# Patient Record
Sex: MALE | Race: WHITE | NOT HISPANIC OR LATINO | URBAN - METROPOLITAN AREA
[De-identification: names, ages, dates, MRNs, and addresses within clinical notes are randomized per-mention and may not be internally consistent; named-entity substitution may affect disease eponyms.]

---

## 2020-09-28 ENCOUNTER — TELEPHONE ENCOUNTER (OUTPATIENT)
Dept: URBAN - METROPOLITAN AREA CLINIC 128 | Facility: CLINIC | Age: 55
End: 2020-09-28

## 2020-10-08 ENCOUNTER — OFFICE VISIT (OUTPATIENT)
Dept: URBAN - METROPOLITAN AREA SURGERY CENTER 31 | Facility: SURGERY CENTER | Age: 55
End: 2020-10-08
Payer: COMMERCIAL

## 2020-10-08 DIAGNOSIS — Z80.0 FAMILY HISTORY MALIGNANT NEOPLASM OF BILIARY TRACT: ICD-10-CM

## 2020-10-08 PROCEDURE — G0105 COLORECTAL SCRN; HI RISK IND: HCPCS | Performed by: INTERNAL MEDICINE

## 2020-10-08 PROCEDURE — G8907 PT DOC NO EVENTS ON DISCHARG: HCPCS | Performed by: INTERNAL MEDICINE

## 2023-03-14 ENCOUNTER — WEB ENCOUNTER (OUTPATIENT)
Dept: URBAN - METROPOLITAN AREA CLINIC 128 | Facility: CLINIC | Age: 58
End: 2023-03-14

## 2023-03-15 ENCOUNTER — WEB ENCOUNTER (OUTPATIENT)
Dept: URBAN - METROPOLITAN AREA CLINIC 128 | Facility: CLINIC | Age: 58
End: 2023-03-15

## 2023-03-15 ENCOUNTER — OFFICE VISIT (OUTPATIENT)
Dept: URBAN - METROPOLITAN AREA CLINIC 128 | Facility: CLINIC | Age: 58
End: 2023-03-15
Payer: COMMERCIAL

## 2023-03-15 VITALS
WEIGHT: 230 LBS | TEMPERATURE: 97.9 F | HEIGHT: 73 IN | SYSTOLIC BLOOD PRESSURE: 126 MMHG | BODY MASS INDEX: 30.48 KG/M2 | DIASTOLIC BLOOD PRESSURE: 72 MMHG

## 2023-03-15 DIAGNOSIS — R13.10 DYSPHAGIA, UNSPECIFIED: ICD-10-CM

## 2023-03-15 PROBLEM — 40739000 DYSPHAGIA: Status: ACTIVE | Noted: 2023-03-15

## 2023-03-15 PROCEDURE — 99203 OFFICE O/P NEW LOW 30 MIN: CPT | Performed by: INTERNAL MEDICINE

## 2023-03-15 RX ORDER — LANSOPRAZOLE 15 MG/1
1 CAPSULE BEFORE A MEAL CAPSULE, DELAYED RELEASE ORAL ONCE A DAY
Status: ACTIVE | COMMUNITY

## 2023-03-15 RX ORDER — LANSOPRAZOLE 30 MG/1
1 TABLET TABLET, ORALLY DISINTEGRATING, DELAYED RELEASE ORAL TWICE A DAY
Qty: 180 TABLET | Refills: 3 | OUTPATIENT
Start: 2023-03-15

## 2023-03-15 NOTE — HPI-TODAY'S VISIT:
Mr. Muñoz comes in for evaluation of dysphagia.  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 Stricture evaluation and dilation has been in a hospital GI lab because the stenosis required a pediatric gastroscope for evaluation.  Last dilation was to 33Fr only because of deep mucosal tear.  He offers that this helped his swallowing.  He has been avoiding swallowing pills because there is a hx of esophageal obstruction with pills.  He eats very slowly and drinks alot of liquid with meals.  Weight stable. No heartburn or chest pain. No blood thinners.  No heart or pulmonary disease.  His oncologist wants to start him on a pill therapy for his CLL if able to take pills consistently.

## 2023-06-06 ENCOUNTER — OFFICE VISIT (OUTPATIENT)
Dept: URBAN - METROPOLITAN AREA MEDICAL CENTER 25 | Facility: MEDICAL CENTER | Age: 58
End: 2023-06-06
Payer: COMMERCIAL

## 2023-06-06 DIAGNOSIS — K22.2 ACQUIRED ESOPHAGEAL RING: ICD-10-CM

## 2023-06-06 DIAGNOSIS — K22.89 DILATATION OF ESOPHAGUS: ICD-10-CM

## 2023-06-06 PROCEDURE — 43239 EGD BIOPSY SINGLE/MULTIPLE: CPT | Performed by: INTERNAL MEDICINE

## 2023-06-13 ENCOUNTER — TELEPHONE ENCOUNTER (OUTPATIENT)
Dept: URBAN - METROPOLITAN AREA CLINIC 128 | Facility: CLINIC | Age: 58
End: 2023-06-13

## 2023-06-14 ENCOUNTER — TELEPHONE ENCOUNTER (OUTPATIENT)
Dept: URBAN - METROPOLITAN AREA CLINIC 128 | Facility: CLINIC | Age: 58
End: 2023-06-14

## 2023-06-14 PROBLEM — 235599003: Status: ACTIVE | Noted: 2023-06-14

## 2023-06-14 RX ORDER — LANSOPRAZOLE 15 MG/1
1 CAPSULE BEFORE A MEAL CAPSULE, DELAYED RELEASE ORAL ONCE A DAY
Status: ACTIVE | COMMUNITY

## 2023-06-14 RX ORDER — FLUTICASONE PROPIONATE 220 UG/1
2 PUFFS AEROSOL, METERED RESPIRATORY (INHALATION) TWICE A DAY
Qty: 2 APPLICATOR | Refills: 1 | OUTPATIENT
Start: 2023-06-14 | End: 2023-12-10

## 2023-06-14 RX ORDER — LANSOPRAZOLE 30 MG/1
1 TABLET TABLET, ORALLY DISINTEGRATING, DELAYED RELEASE ORAL TWICE A DAY
Qty: 180 TABLET | Refills: 3 | Status: ACTIVE | COMMUNITY
Start: 2023-03-15

## 2023-07-03 ENCOUNTER — WEB ENCOUNTER (OUTPATIENT)
Dept: URBAN - METROPOLITAN AREA CLINIC 128 | Facility: CLINIC | Age: 58
End: 2023-07-03

## 2023-07-06 ENCOUNTER — OFFICE VISIT (OUTPATIENT)
Dept: URBAN - METROPOLITAN AREA CLINIC 128 | Facility: CLINIC | Age: 58
End: 2023-07-06
Payer: COMMERCIAL

## 2023-07-06 VITALS
WEIGHT: 234.6 LBS | BODY MASS INDEX: 31.09 KG/M2 | DIASTOLIC BLOOD PRESSURE: 88 MMHG | HEART RATE: 61 BPM | SYSTOLIC BLOOD PRESSURE: 140 MMHG | TEMPERATURE: 97.8 F | HEIGHT: 73 IN

## 2023-07-06 DIAGNOSIS — K20.90 ACUTE ESOPHAGITIS: ICD-10-CM

## 2023-07-06 PROCEDURE — 99214 OFFICE O/P EST MOD 30 MIN: CPT | Performed by: INTERNAL MEDICINE

## 2023-07-06 RX ORDER — FLUTICASONE PROPIONATE 220 UG/1
2 PUFFS AEROSOL, METERED RESPIRATORY (INHALATION) TWICE A DAY
Qty: 2 APPLICATOR | Refills: 1 | OUTPATIENT

## 2023-07-06 RX ORDER — FLUTICASONE PROPIONATE 220 UG/1
2 PUFFS AEROSOL, METERED RESPIRATORY (INHALATION) TWICE A DAY
Qty: 2 APPLICATOR | Refills: 1 | Status: ACTIVE | COMMUNITY
Start: 2023-06-14 | End: 2023-12-10

## 2023-07-06 RX ORDER — LANSOPRAZOLE 15 MG/1
1 CAPSULE BEFORE A MEAL CAPSULE, DELAYED RELEASE ORAL ONCE A DAY
Status: ACTIVE | COMMUNITY

## 2023-07-06 RX ORDER — LANSOPRAZOLE 30 MG/1
1 TABLET TABLET, ORALLY DISINTEGRATING, DELAYED RELEASE ORAL TWICE A DAY
Qty: 180 TABLET | Refills: 3 | Status: ACTIVE | COMMUNITY
Start: 2023-03-15

## 2023-07-06 NOTE — HPI-TODAY'S VISIT:
Mr. Muñoz comes in for evaluation of dysphagia.  There is a long hx of difficulty swallowing associated with a mid esophageal high grade stenosis. Stricture evaluation and dilation has been in a hospital GI lab because the stenosis required a pediatric gastroscope for evaluation.  Last dilation was to 33Fr only because of deep mucosal tear.  He offers that this helped his swallowing.  He has been avoiding swallowing pills because there is a hx of esophageal obstruction with pills.  He eats very slowly and drinks alot of liquid with meals.  Weight stable. No heartburn or chest pain. No blood thinners.  No heart or pulmonary disease.  His oncologist wants his dysphagia treated in the event he ever has to start on a pill therapy for his CLL. For now, his CLL is well controlled and he will not need these pills. The oncologist is trying to circumvent using a feeding tube to get his CLL treatment in in the event he ever needs this therapy in the future. His 2020 colonoscopy was normal, advised to repeat it in 5 years. His 2023 EGD revealed eosinophilic esophagitis. He took 1 week of the flovent then stopped it as he was not aware he needed to take it for 2 months. He states his dysphagia symptoms have now ceased and he felt better even after only 1 week of the flovent.

## 2023-07-11 ENCOUNTER — TELEPHONE ENCOUNTER (OUTPATIENT)
Dept: URBAN - METROPOLITAN AREA CLINIC 128 | Facility: CLINIC | Age: 58
End: 2023-07-11

## 2023-07-11 RX ORDER — DUPILUMAB 300 MG/2ML
AS DIRECTED INJECTION, SOLUTION SUBCUTANEOUS
Qty: 4 | Refills: 2 | OUTPATIENT
Start: 2023-07-11 | End: 2023-10-09

## 2023-07-12 ENCOUNTER — TELEPHONE ENCOUNTER (OUTPATIENT)
Dept: URBAN - METROPOLITAN AREA CLINIC 128 | Facility: CLINIC | Age: 58
End: 2023-07-12

## 2023-07-13 ENCOUNTER — TELEPHONE ENCOUNTER (OUTPATIENT)
Dept: URBAN - METROPOLITAN AREA CLINIC 128 | Facility: CLINIC | Age: 58
End: 2023-07-13

## 2023-07-26 ENCOUNTER — TELEPHONE ENCOUNTER (OUTPATIENT)
Dept: URBAN - METROPOLITAN AREA CLINIC 19 | Facility: CLINIC | Age: 58
End: 2023-07-26

## 2023-07-28 ENCOUNTER — OFFICE VISIT (OUTPATIENT)
Dept: URBAN - METROPOLITAN AREA CLINIC 128 | Facility: CLINIC | Age: 58
End: 2023-07-28

## 2023-08-31 ENCOUNTER — OFFICE VISIT (OUTPATIENT)
Dept: URBAN - METROPOLITAN AREA CLINIC 128 | Facility: CLINIC | Age: 58
End: 2023-08-31

## 2023-09-01 ENCOUNTER — OFFICE VISIT (OUTPATIENT)
Dept: URBAN - METROPOLITAN AREA CLINIC 128 | Facility: CLINIC | Age: 58
End: 2023-09-01
Payer: COMMERCIAL

## 2023-09-01 VITALS
DIASTOLIC BLOOD PRESSURE: 78 MMHG | SYSTOLIC BLOOD PRESSURE: 126 MMHG | HEIGHT: 73 IN | HEART RATE: 60 BPM | WEIGHT: 228.2 LBS | TEMPERATURE: 97.5 F | BODY MASS INDEX: 30.24 KG/M2

## 2023-09-01 DIAGNOSIS — K20.0 EOSINOPHILIC ESOPHAGITIS: ICD-10-CM

## 2023-09-01 DIAGNOSIS — Z80.0 FAMILY HISTORY OF COLON CANCER: ICD-10-CM

## 2023-09-01 PROCEDURE — 99214 OFFICE O/P EST MOD 30 MIN: CPT | Performed by: PHYSICIAN ASSISTANT

## 2023-09-01 RX ORDER — DUPILUMAB 300 MG/2ML
AS DIRECTED INJECTION, SOLUTION SUBCUTANEOUS
Qty: 4 | Refills: 2 | Status: ACTIVE | COMMUNITY
Start: 2023-07-11 | End: 2023-10-09

## 2023-09-01 RX ORDER — LANSOPRAZOLE 15 MG/1
1 CAPSULE BEFORE A MEAL CAPSULE, DELAYED RELEASE ORAL ONCE A DAY
Status: ACTIVE | COMMUNITY

## 2023-09-01 RX ORDER — FLUTICASONE PROPIONATE 220 UG/1
2 PUFFS AEROSOL, METERED RESPIRATORY (INHALATION) TWICE A DAY
Qty: 2 APPLICATOR | Refills: 1 | OUTPATIENT

## 2023-09-01 RX ORDER — FLUTICASONE PROPIONATE 220 UG/1
2 PUFFS AEROSOL, METERED RESPIRATORY (INHALATION) TWICE A DAY
Qty: 2 APPLICATOR | Refills: 1 | Status: ACTIVE | COMMUNITY

## 2023-09-01 RX ORDER — LANSOPRAZOLE 30 MG/1
1 TABLET TABLET, ORALLY DISINTEGRATING, DELAYED RELEASE ORAL TWICE A DAY
Qty: 180 TABLET | Refills: 3 | Status: ACTIVE | COMMUNITY
Start: 2023-03-15

## 2023-09-01 NOTE — HPI-TODAY'S VISIT:
Mr. Muñoz comes in for follow up evaluation of dysphagia which has improved vastly since last visit. He is tolerating the dupixent well. There is a long hx of difficulty swallowing associated with a mid esophageal high grade stenosis. Stricture evaluation and dilation has been in a hospital GI lab because the stenosis required a pediatric gastroscope for evaluation.  Last dilation was to 33Fr only because of deep mucosal tear.  He offers that this helped his swallowing.  He has been avoiding swallowing pills because there is a hx of esophageal obstruction with pills.  He eats very slowly and drinks alot of liquid with meals but is now feeling.  Weight stable. No heartburn or chest pain. No blood thinners.  No heart or pulmonary disease.  His oncologist wants his dysphagia treated in the event he ever has to start on a pill therapy for his CLL. For now, his CLL is well controlled and he will not need these pills. The oncologist is trying to circumvent using a feeding tube to get his CLL treatment in in the event he ever needs this therapy in the future. His 2020 colonoscopy was normal, advised to repeat it in 5 years. His 2023 EGD revealed eosinophilic esophagitis. His oncologist is Dr. Lombardi with [place] who is managing his CLL.

## 2023-09-20 ENCOUNTER — ERX REFILL RESPONSE (OUTPATIENT)
Dept: URBAN - METROPOLITAN AREA CLINIC 128 | Facility: CLINIC | Age: 58
End: 2023-09-20

## 2023-09-20 RX ORDER — DUPILUMAB 300 MG/2ML
INJECT 1 PEN UNDER THE SKIN EVERY 7 DAYS INJECTION, SOLUTION SUBCUTANEOUS
Qty: 8 | Refills: 2 | OUTPATIENT

## 2023-09-20 RX ORDER — DUPILUMAB 300 MG/2ML
INJECT 1 PEN UNDER THE SKIN EVERY 7 DAYS INJECTION, SOLUTION SUBCUTANEOUS
Qty: 8 | Refills: 3 | OUTPATIENT

## 2023-11-01 ENCOUNTER — TELEPHONE ENCOUNTER (OUTPATIENT)
Dept: URBAN - METROPOLITAN AREA CLINIC 128 | Facility: CLINIC | Age: 58
End: 2023-11-01

## 2023-12-05 ENCOUNTER — OFFICE VISIT (OUTPATIENT)
Dept: URBAN - METROPOLITAN AREA MEDICAL CENTER 25 | Facility: MEDICAL CENTER | Age: 58
End: 2023-12-05
Payer: COMMERCIAL

## 2023-12-05 ENCOUNTER — OFFICE VISIT (OUTPATIENT)
Dept: URBAN - METROPOLITAN AREA MEDICAL CENTER 25 | Facility: MEDICAL CENTER | Age: 58
End: 2023-12-05

## 2023-12-05 DIAGNOSIS — K20.0 ALLERGIC EOSINOPHILIC ESOPHAGITIS: ICD-10-CM

## 2023-12-05 PROCEDURE — 43239 EGD BIOPSY SINGLE/MULTIPLE: CPT | Performed by: INTERNAL MEDICINE

## 2023-12-05 RX ORDER — FLUTICASONE PROPIONATE 220 UG/1
2 PUFFS AEROSOL, METERED RESPIRATORY (INHALATION) TWICE A DAY
Qty: 2 APPLICATOR | Refills: 1 | Status: ACTIVE | COMMUNITY

## 2023-12-05 RX ORDER — LANSOPRAZOLE 15 MG/1
1 CAPSULE BEFORE A MEAL CAPSULE, DELAYED RELEASE ORAL ONCE A DAY
Status: ACTIVE | COMMUNITY

## 2023-12-05 RX ORDER — LANSOPRAZOLE 30 MG/1
1 TABLET TABLET, ORALLY DISINTEGRATING, DELAYED RELEASE ORAL TWICE A DAY
Qty: 180 TABLET | Refills: 3 | Status: ACTIVE | COMMUNITY
Start: 2023-03-15

## 2023-12-05 RX ORDER — DUPILUMAB 300 MG/2ML
INJECT 1 PEN UNDER THE SKIN EVERY 7 DAYS INJECTION, SOLUTION SUBCUTANEOUS
Qty: 8 | Refills: 2 | Status: ACTIVE | COMMUNITY

## 2023-12-12 ENCOUNTER — ERX REFILL RESPONSE (OUTPATIENT)
Dept: URBAN - METROPOLITAN AREA CLINIC 128 | Facility: CLINIC | Age: 58
End: 2023-12-12

## 2023-12-12 RX ORDER — DUPILUMAB 300 MG/2ML
INJECT 1 PEN UNDER THE SKIN EVERY 7 DAYS INJECTION, SOLUTION SUBCUTANEOUS
Qty: 8 | Refills: 2 | OUTPATIENT

## 2024-02-12 ENCOUNTER — OV EP (OUTPATIENT)
Dept: URBAN - METROPOLITAN AREA CLINIC 128 | Facility: CLINIC | Age: 59
End: 2024-02-12
Payer: COMMERCIAL

## 2024-02-12 VITALS
TEMPERATURE: 98.6 F | WEIGHT: 239.4 LBS | DIASTOLIC BLOOD PRESSURE: 68 MMHG | HEIGHT: 73 IN | SYSTOLIC BLOOD PRESSURE: 130 MMHG | BODY MASS INDEX: 31.73 KG/M2

## 2024-02-12 DIAGNOSIS — K20.0 EOSINOPHILIC ESOPHAGITIS: ICD-10-CM

## 2024-02-12 DIAGNOSIS — Z80.0 FAMILY HISTORY OF COLON CANCER: ICD-10-CM

## 2024-02-12 PROCEDURE — 99214 OFFICE O/P EST MOD 30 MIN: CPT | Performed by: PHYSICIAN ASSISTANT

## 2024-02-12 RX ORDER — LANSOPRAZOLE 15 MG/1
1 CAPSULE BEFORE A MEAL CAPSULE, DELAYED RELEASE ORAL ONCE A DAY
Status: ON HOLD | COMMUNITY

## 2024-02-12 RX ORDER — DUPILUMAB 300 MG/2ML
INJECT 1 PEN UNDER THE SKIN EVERY 7 DAYS INJECTION, SOLUTION SUBCUTANEOUS
Qty: 8 | Refills: 2 | Status: ACTIVE | COMMUNITY

## 2024-02-12 RX ORDER — FLUTICASONE PROPIONATE 220 UG/1
2 PUFFS AEROSOL, METERED RESPIRATORY (INHALATION) TWICE A DAY
Qty: 2 APPLICATOR | Refills: 1 | Status: ON HOLD | COMMUNITY

## 2024-02-12 RX ORDER — LANSOPRAZOLE 30 MG/1
1 TABLET TABLET, ORALLY DISINTEGRATING, DELAYED RELEASE ORAL TWICE A DAY
Qty: 180 TABLET | Refills: 3 | Status: ON HOLD | COMMUNITY
Start: 2023-03-15

## 2024-02-12 NOTE — HPI-TODAY'S VISIT:
Mr. Muñoz comes in for follow up evaluation of dysphagia which has improved vastly since last visit. He is tolerating the dupixent well. There is a long hx of difficulty swallowing associated with a mid esophageal high grade stenosis. Stricture evaluation and dilation has been in a hospital GI lab because the stenosis required a pediatric gastroscope for evaluation.  Last dilation was to 33Fr only because of deep mucosal tear.  He offers that this helped his swallowing.  He has been avoiding swallowing pills because there is a hx of esophageal obstruction with pills.  He eats very slowly and drinks alot of liquid with meals but is now feeling.  Weight stable. No heartburn or chest pain. No blood thinners.  No heart or pulmonary disease.  His oncologist wants his dysphagia treated in the event he ever has to start on a pill therapy for his CLL. For now, his CLL is well controlled and he will not need these pills. The oncologist is trying to circumvent using a feeding tube to get his CLL treatment in in the event he ever needs this therapy in the future. His 2020 colonoscopy was normal, advised to repeat it in 5 years. His 6/2023 EGD revealed eosinophilic esophagitis. His oncologist is Dr. Lombardi with [facility] who is managing his CLL. 12/2023 EGD revealed 25 and 15 eosinophils with no dysplasia or malignancy. He is doing well on dupixent and is tolerating it well and has no GERD or dysphagia symptoms currently.

## 2024-09-17 ENCOUNTER — ERX REFILL RESPONSE (OUTPATIENT)
Dept: URBAN - METROPOLITAN AREA CLINIC 128 | Facility: CLINIC | Age: 59
End: 2024-09-17

## 2024-09-17 RX ORDER — DUPILUMAB 300 MG/2ML
INJECT 1 PEN UNDER THE SKIN EVERY 7 DAYS INJECTION, SOLUTION SUBCUTANEOUS
Qty: 8 | Refills: 3 | OUTPATIENT

## 2024-09-17 RX ORDER — DUPILUMAB 300 MG/2ML
INJECT 1 PEN UNDER THE SKIN EVERY 7 DAYS INJECTION, SOLUTION SUBCUTANEOUS
Qty: 8 | Refills: 2 | OUTPATIENT

## 2024-09-30 ENCOUNTER — TELEPHONE ENCOUNTER (OUTPATIENT)
Dept: URBAN - METROPOLITAN AREA CLINIC 128 | Facility: CLINIC | Age: 59
End: 2024-09-30

## 2025-01-07 ENCOUNTER — ERX REFILL RESPONSE (OUTPATIENT)
Dept: URBAN - METROPOLITAN AREA CLINIC 128 | Facility: CLINIC | Age: 60
End: 2025-01-07

## 2025-01-07 RX ORDER — DUPILUMAB 300 MG/2ML
INJECT 1 PEN UNDER THE SKIN EVERY 7 DAYS INJECTION, SOLUTION SUBCUTANEOUS
Qty: 8 | Refills: 2 | OUTPATIENT

## 2025-01-15 ENCOUNTER — TELEPHONE ENCOUNTER (OUTPATIENT)
Dept: URBAN - METROPOLITAN AREA CLINIC 128 | Facility: CLINIC | Age: 60
End: 2025-01-15

## 2025-01-15 RX ORDER — DUPILUMAB 300 MG/2ML
INJECT 1 PEN UNDER THE SKIN EVERY 7 DAYS INJECTION, SOLUTION SUBCUTANEOUS
Qty: 12 | Refills: 1

## 2025-01-21 ENCOUNTER — TELEPHONE ENCOUNTER (OUTPATIENT)
Dept: URBAN - METROPOLITAN AREA CLINIC 128 | Facility: CLINIC | Age: 60
End: 2025-01-21

## 2025-01-23 ENCOUNTER — TELEPHONE ENCOUNTER (OUTPATIENT)
Dept: URBAN - METROPOLITAN AREA CLINIC 128 | Facility: CLINIC | Age: 60
End: 2025-01-23

## 2025-01-27 ENCOUNTER — TELEPHONE ENCOUNTER (OUTPATIENT)
Dept: URBAN - METROPOLITAN AREA CLINIC 128 | Facility: CLINIC | Age: 60
End: 2025-01-27

## 2025-01-27 RX ORDER — DUPILUMAB 300 MG/2ML
INJECT 1 PEN UNDER THE SKIN EVERY 7 DAYS INJECTION, SOLUTION SUBCUTANEOUS
Qty: 12 | Refills: 1
End: 2025-07-14

## 2025-01-30 ENCOUNTER — DASHBOARD ENCOUNTERS (OUTPATIENT)
Age: 60
End: 2025-01-30

## 2025-01-30 ENCOUNTER — OFFICE VISIT (OUTPATIENT)
Dept: URBAN - METROPOLITAN AREA CLINIC 128 | Facility: CLINIC | Age: 60
End: 2025-01-30
Payer: COMMERCIAL

## 2025-01-30 VITALS
HEART RATE: 67 BPM | DIASTOLIC BLOOD PRESSURE: 78 MMHG | HEIGHT: 73 IN | WEIGHT: 234 LBS | BODY MASS INDEX: 31.01 KG/M2 | TEMPERATURE: 98.2 F | SYSTOLIC BLOOD PRESSURE: 140 MMHG

## 2025-01-30 DIAGNOSIS — Z80.0 FAMILY HISTORY OF COLON CANCER: ICD-10-CM

## 2025-01-30 DIAGNOSIS — K20.0 EOSINOPHILIC ESOPHAGITIS: ICD-10-CM

## 2025-01-30 PROCEDURE — 99213 OFFICE O/P EST LOW 20 MIN: CPT | Performed by: PHYSICIAN ASSISTANT

## 2025-01-30 RX ORDER — LANSOPRAZOLE 30 MG/1
1 TABLET TABLET, ORALLY DISINTEGRATING, DELAYED RELEASE ORAL TWICE A DAY
Qty: 180 TABLET | Refills: 3 | Status: ON HOLD | COMMUNITY
Start: 2023-03-15

## 2025-01-30 RX ORDER — POLYETHYLENE GLYCOL 3350, SODIUM SULFATE ANHYDROUS, SODIUM BICARBONATE, SODIUM CHLORIDE, POTASSIUM CHLORIDE 236; 22.74; 6.74; 5.86; 2.97 G/4L; G/4L; G/4L; G/4L; G/4L
AS DIRECTED POWDER, FOR SOLUTION ORAL ONCE A DAY
Qty: 1 BOTTLE | Refills: 0 | OUTPATIENT
Start: 2025-01-30 | End: 2025-01-31

## 2025-01-30 RX ORDER — LANSOPRAZOLE 15 MG/1
1 CAPSULE BEFORE A MEAL CAPSULE, DELAYED RELEASE ORAL ONCE A DAY
Status: ON HOLD | COMMUNITY

## 2025-01-30 RX ORDER — FLUTICASONE PROPIONATE 220 UG/1
2 PUFFS AEROSOL, METERED RESPIRATORY (INHALATION) TWICE A DAY
Qty: 2 APPLICATOR | Refills: 1 | Status: ON HOLD | COMMUNITY

## 2025-01-30 RX ORDER — DUPILUMAB 300 MG/2ML
INJECT 1 PEN UNDER THE SKIN EVERY 7 DAYS INJECTION, SOLUTION SUBCUTANEOUS
Qty: 12 | Refills: 1 | Status: ACTIVE | COMMUNITY
End: 2025-07-14

## 2025-01-30 NOTE — HPI-TODAY'S VISIT:
Mr. Muñoz comes in for follow up evaluation of dysphagia which has improved vastly since last visit. He is tolerating the dupixent well. There is a long hx of difficulty swallowing associated with a mid esophageal high grade stenosis. Stricture evaluation and dilation has been in a hospital GI lab because the stenosis required a pediatric gastroscope for evaluation.  Last dilation was to 33Fr only because of deep mucosal tear.  He offers that this helped his swallowing.  He has been avoiding swallowing pills because there is a hx of esophageal obstruction with pills.  He eats very slowly and drinks alot of liquid with meals but is now feeling.  Weight stable. No heartburn or chest pain. No blood thinners.  No heart or pulmonary disease.  His oncologist wants his dysphagia treated in the event he ever has to start on a pill therapy for his CLL. For now, his CLL is well controlled and he will not need these pills. The oncologist is trying to circumvent using a feeding tube to get his CLL treatment in in the event he ever needs this therapy in the future. His 2020 colonoscopy was normal, advised to repeat it in 5 years. His 6/2023 EGD revealed eosinophilic esophagitis. His oncologist is Dr. Lombardi with [facility] who is managing his CLL. 12/2023 EGD revealed 25 and 15 eosinophils with no dysplasia or malignancy. He is doing well on dupixent and is tolerating it well and has no GERD or dysphagia symptoms currently. Patient denies any heart, lung, or kidney problems.  Patient denies any blood thinners or oxygen therapy. Denies any dialysis. Denies any pacemaker or defibrillator.

## 2025-02-07 ENCOUNTER — OFFICE VISIT (OUTPATIENT)
Dept: URBAN - METROPOLITAN AREA CLINIC 128 | Facility: CLINIC | Age: 60
End: 2025-02-07

## 2025-08-20 ENCOUNTER — ERX REFILL RESPONSE (OUTPATIENT)
Dept: URBAN - METROPOLITAN AREA CLINIC 128 | Facility: CLINIC | Age: 60
End: 2025-08-20

## 2025-08-20 RX ORDER — DUPILUMAB 300 MG/2ML
INJECT 1 PEN UNDER THE SKIN EVERY 7 DAYS INJECTION, SOLUTION SUBCUTANEOUS
Qty: 8 | Refills: 1 | OUTPATIENT